# Patient Record
Sex: MALE | Race: WHITE | Employment: UNEMPLOYED | URBAN - METROPOLITAN AREA
[De-identification: names, ages, dates, MRNs, and addresses within clinical notes are randomized per-mention and may not be internally consistent; named-entity substitution may affect disease eponyms.]

---

## 2017-04-05 ENCOUNTER — APPOINTMENT (OUTPATIENT)
Dept: CT IMAGING | Age: 57
End: 2017-04-05
Attending: EMERGENCY MEDICINE
Payer: SELF-PAY

## 2017-04-05 ENCOUNTER — HOSPITAL ENCOUNTER (EMERGENCY)
Age: 57
Discharge: HOME | End: 2017-04-05
Attending: EMERGENCY MEDICINE
Payer: SELF-PAY

## 2017-04-05 VITALS
HEIGHT: 70 IN | HEART RATE: 75 BPM | DIASTOLIC BLOOD PRESSURE: 79 MMHG | OXYGEN SATURATION: 96 % | WEIGHT: 153.22 LBS | BODY MASS INDEX: 21.94 KG/M2 | SYSTOLIC BLOOD PRESSURE: 135 MMHG | RESPIRATION RATE: 12 BRPM | TEMPERATURE: 98.2 F

## 2017-04-05 DIAGNOSIS — K52.9 GASTROENTERITIS, ACUTE: ICD-10-CM

## 2017-04-05 DIAGNOSIS — R79.89 ELEVATED LIVER FUNCTION TESTS: ICD-10-CM

## 2017-04-05 DIAGNOSIS — G43.919 INTRACTABLE MIGRAINE, UNSPECIFIED MIGRAINE TYPE: Primary | ICD-10-CM

## 2017-04-05 LAB
ALBUMIN SERPL BCP-MCNC: 3.1 G/DL (ref 3.5–5)
ALBUMIN/GLOB SERPL: 0.6 {RATIO} (ref 1.1–2.2)
ALP SERPL-CCNC: 123 U/L (ref 45–117)
ALT SERPL-CCNC: 150 U/L (ref 12–78)
ANION GAP BLD CALC-SCNC: 7 MMOL/L (ref 5–15)
APPEARANCE UR: CLEAR
AST SERPL W P-5'-P-CCNC: 133 U/L (ref 15–37)
BASOPHILS # BLD AUTO: 0 K/UL (ref 0–0.1)
BASOPHILS # BLD: 0 % (ref 0–1)
BILIRUB SERPL-MCNC: 0.5 MG/DL (ref 0.2–1)
BILIRUB UR QL: NEGATIVE
BUN SERPL-MCNC: 8 MG/DL (ref 6–20)
BUN/CREAT SERPL: 9 (ref 12–20)
CALCIUM SERPL-MCNC: 8.7 MG/DL (ref 8.5–10.1)
CHLORIDE SERPL-SCNC: 99 MMOL/L (ref 97–108)
CO2 SERPL-SCNC: 28 MMOL/L (ref 21–32)
COLOR UR: NORMAL
CREAT SERPL-MCNC: 0.92 MG/DL (ref 0.7–1.3)
EOSINOPHIL # BLD: 0 K/UL (ref 0–0.4)
EOSINOPHIL NFR BLD: 1 % (ref 0–7)
ERYTHROCYTE [DISTWIDTH] IN BLOOD BY AUTOMATED COUNT: 12.6 % (ref 11.5–14.5)
FLUAV AG NPH QL IA: NEGATIVE
FLUBV AG NOSE QL IA: NEGATIVE
GLOBULIN SER CALC-MCNC: 5.2 G/DL (ref 2–4)
GLUCOSE SERPL-MCNC: 99 MG/DL (ref 65–100)
GLUCOSE UR STRIP.AUTO-MCNC: NEGATIVE MG/DL
HCT VFR BLD AUTO: 42.6 % (ref 36.6–50.3)
HGB BLD-MCNC: 14.9 G/DL (ref 12.1–17)
HGB UR QL STRIP: NEGATIVE
KETONES UR QL STRIP.AUTO: NEGATIVE MG/DL
LEUKOCYTE ESTERASE UR QL STRIP.AUTO: NEGATIVE
LIPASE SERPL-CCNC: 123 U/L (ref 73–393)
LYMPHOCYTES # BLD AUTO: 23 % (ref 12–49)
LYMPHOCYTES # BLD: 1.4 K/UL (ref 0.8–3.5)
MAGNESIUM SERPL-MCNC: 1.7 MG/DL (ref 1.6–2.4)
MCH RBC QN AUTO: 33.9 PG (ref 26–34)
MCHC RBC AUTO-ENTMCNC: 35 G/DL (ref 30–36.5)
MCV RBC AUTO: 97 FL (ref 80–99)
MONOCYTES # BLD: 0.8 K/UL (ref 0–1)
MONOCYTES NFR BLD AUTO: 13 % (ref 5–13)
NEUTS SEG # BLD: 3.8 K/UL (ref 1.8–8)
NEUTS SEG NFR BLD AUTO: 63 % (ref 32–75)
NITRITE UR QL STRIP.AUTO: NEGATIVE
PH UR STRIP: 6 [PH] (ref 5–8)
PLATELET # BLD AUTO: 124 K/UL (ref 150–400)
POTASSIUM SERPL-SCNC: 4.3 MMOL/L (ref 3.5–5.1)
PROT SERPL-MCNC: 8.3 G/DL (ref 6.4–8.2)
PROT UR STRIP-MCNC: NEGATIVE MG/DL
RBC # BLD AUTO: 4.39 M/UL (ref 4.1–5.7)
SODIUM SERPL-SCNC: 134 MMOL/L (ref 136–145)
SP GR UR REFRACTOMETRY: 1.02 (ref 1–1.03)
UROBILINOGEN UR QL STRIP.AUTO: 1 EU/DL (ref 0.2–1)
WBC # BLD AUTO: 6 K/UL (ref 4.1–11.1)

## 2017-04-05 PROCEDURE — 83735 ASSAY OF MAGNESIUM: CPT | Performed by: EMERGENCY MEDICINE

## 2017-04-05 PROCEDURE — 81003 URINALYSIS AUTO W/O SCOPE: CPT | Performed by: EMERGENCY MEDICINE

## 2017-04-05 PROCEDURE — 74011000250 HC RX REV CODE- 250: Performed by: EMERGENCY MEDICINE

## 2017-04-05 PROCEDURE — 80053 COMPREHEN METABOLIC PANEL: CPT | Performed by: EMERGENCY MEDICINE

## 2017-04-05 PROCEDURE — 70450 CT HEAD/BRAIN W/O DYE: CPT

## 2017-04-05 PROCEDURE — 85025 COMPLETE CBC W/AUTO DIFF WBC: CPT | Performed by: EMERGENCY MEDICINE

## 2017-04-05 PROCEDURE — 83690 ASSAY OF LIPASE: CPT | Performed by: EMERGENCY MEDICINE

## 2017-04-05 PROCEDURE — 96361 HYDRATE IV INFUSION ADD-ON: CPT

## 2017-04-05 PROCEDURE — 99284 EMERGENCY DEPT VISIT MOD MDM: CPT

## 2017-04-05 PROCEDURE — 96375 TX/PRO/DX INJ NEW DRUG ADDON: CPT

## 2017-04-05 PROCEDURE — 74011250636 HC RX REV CODE- 250/636: Performed by: EMERGENCY MEDICINE

## 2017-04-05 PROCEDURE — 87804 INFLUENZA ASSAY W/OPTIC: CPT | Performed by: EMERGENCY MEDICINE

## 2017-04-05 PROCEDURE — 36415 COLL VENOUS BLD VENIPUNCTURE: CPT | Performed by: EMERGENCY MEDICINE

## 2017-04-05 PROCEDURE — 96374 THER/PROPH/DIAG INJ IV PUSH: CPT

## 2017-04-05 RX ORDER — ONDANSETRON 4 MG/1
4 TABLET, FILM COATED ORAL
Qty: 20 TAB | Refills: 0 | Status: SHIPPED | OUTPATIENT
Start: 2017-04-05

## 2017-04-05 RX ORDER — DIPHENHYDRAMINE HYDROCHLORIDE 50 MG/ML
25 INJECTION, SOLUTION INTRAMUSCULAR; INTRAVENOUS
Status: COMPLETED | OUTPATIENT
Start: 2017-04-05 | End: 2017-04-05

## 2017-04-05 RX ORDER — OXYCODONE AND ACETAMINOPHEN 5; 325 MG/1; MG/1
1 TABLET ORAL
Qty: 20 TAB | Refills: 0 | Status: SHIPPED | OUTPATIENT
Start: 2017-04-05

## 2017-04-05 RX ORDER — HYDROMORPHONE HYDROCHLORIDE 1 MG/ML
1 INJECTION, SOLUTION INTRAMUSCULAR; INTRAVENOUS; SUBCUTANEOUS
Status: COMPLETED | OUTPATIENT
Start: 2017-04-05 | End: 2017-04-05

## 2017-04-05 RX ORDER — LOPERAMIDE HYDROCHLORIDE 2 MG/1
2 CAPSULE ORAL
Qty: 20 CAP | Refills: 0 | Status: SHIPPED | OUTPATIENT
Start: 2017-04-05

## 2017-04-05 RX ORDER — MORPHINE SULFATE 2 MG/ML
6 INJECTION, SOLUTION INTRAMUSCULAR; INTRAVENOUS
Status: COMPLETED | OUTPATIENT
Start: 2017-04-05 | End: 2017-04-05

## 2017-04-05 RX ORDER — ONDANSETRON 2 MG/ML
4 INJECTION INTRAMUSCULAR; INTRAVENOUS
Status: COMPLETED | OUTPATIENT
Start: 2017-04-05 | End: 2017-04-05

## 2017-04-05 RX ORDER — DIAZEPAM 5 MG/1
5 TABLET ORAL
Qty: 20 TAB | Refills: 0 | Status: SHIPPED | OUTPATIENT
Start: 2017-04-05

## 2017-04-05 RX ADMIN — SODIUM CHLORIDE 1000 ML: 900 INJECTION, SOLUTION INTRAVENOUS at 16:06

## 2017-04-05 RX ADMIN — ONDANSETRON HYDROCHLORIDE 4 MG: 2 INJECTION, SOLUTION INTRAMUSCULAR; INTRAVENOUS at 16:07

## 2017-04-05 RX ADMIN — SODIUM CHLORIDE 10 MG: 9 INJECTION INTRAMUSCULAR; INTRAVENOUS; SUBCUTANEOUS at 17:05

## 2017-04-05 RX ADMIN — DIPHENHYDRAMINE HYDROCHLORIDE 25 MG: 50 INJECTION, SOLUTION INTRAMUSCULAR; INTRAVENOUS at 17:03

## 2017-04-05 RX ADMIN — SODIUM CHLORIDE 1000 ML: 900 INJECTION, SOLUTION INTRAVENOUS at 17:19

## 2017-04-05 RX ADMIN — Medication 6 MG: at 16:06

## 2017-04-05 RX ADMIN — HYDROMORPHONE HYDROCHLORIDE 1 MG: 1 INJECTION, SOLUTION INTRAMUSCULAR; INTRAVENOUS; SUBCUTANEOUS at 17:02

## 2017-04-05 NOTE — ED NOTES
Pt sleeping when RN approached him for re-evaluation. Vital signs stable. No acute distress noted. When asked about his pain, pt requested more pain medication-Dr. Graham Esposito aware.

## 2017-04-05 NOTE — ED NOTES
Unable to administer Morphine because it was not verified by Pharmacy. Spoke with [facility] to resolve this issue.

## 2017-04-05 NOTE — ED PROVIDER NOTES
HPI Comments: Milena Tee is a 49 y.o. male who presents ambulatory to ED c/o constant, 10/10, throbbing right sided headache that radiates down his neck for the past 3 days, along with associated sinus congestion. Pt also states he has had diarrhea and moderate abdominal pain for the past week. Pt states he has been taking BC's and NyQuil with no relief. Pt states he does not typically have pain in his neck with his normal headaches. Pt denies any fever, body aches, head trauma. There are no other complaints, changes or physical findings at this time. The history is provided by the patient. No  was used. History reviewed. No pertinent past medical history. History reviewed. No pertinent surgical history. History reviewed. No pertinent family history. Social History     Social History    Marital status: SINGLE     Spouse name: N/A    Number of children: N/A    Years of education: N/A     Occupational History    Not on file. Social History Main Topics    Smoking status: Current Every Day Smoker     Packs/day: 0.50     Years: 30.00    Smokeless tobacco: Not on file    Alcohol use Yes      Comment: 1 alcohol on weekend    Drug use: No    Sexual activity: Not on file     Other Topics Concern    Not on file     Social History Narrative    No narrative on file         ALLERGIES: Review of patient's allergies indicates no known allergies. Review of Systems   Constitutional: Negative for fever. HENT: Positive for congestion. Eyes: Negative. Respiratory: Negative for chest tightness. Cardiovascular: Negative for chest pain. Gastrointestinal: Positive for abdominal pain and diarrhea. Endocrine: Negative for heat intolerance. Genitourinary: Negative for dysuria. Musculoskeletal: Positive for neck pain. Negative for myalgias. Skin: Negative for rash. Allergic/Immunologic: Negative for immunocompromised state.    Neurological: Positive for headaches. Hematological: Does not bruise/bleed easily. Psychiatric/Behavioral: Negative. All other systems reviewed and are negative. Vitals:    04/05/17 1301 04/05/17 1510   BP: 127/76 128/77   Pulse: (!) 111 87   Resp: 18 20   Temp: 98.2 °F (36.8 °C)    SpO2: 100% 94%   Weight: 69.5 kg (153 lb 3.5 oz)    Height: 5' 10\" (1.778 m)             Physical Exam   Constitutional: He is oriented to person, place, and time. He appears well-developed and well-nourished. He appears distressed (moderate). HENT:   Head: Normocephalic and atraumatic. Eyes: EOM are normal. Pupils are equal, round, and reactive to light. Neck: Normal range of motion. Neck supple. Neck non-tender   Cardiovascular: Normal rate, regular rhythm and normal heart sounds. Pulmonary/Chest: Effort normal and breath sounds normal. He has no wheezes. Abdominal: Soft. Bowel sounds are normal. There is no tenderness. Musculoskeletal: Normal range of motion. He exhibits no edema or tenderness. Neurological: He is alert and oriented to person, place, and time. He has normal strength. No cranial nerve deficit or sensory deficit. Skin: Skin is warm and dry. Psychiatric: He has a normal mood and affect. Nursing note and vitals reviewed. MDM  Number of Diagnoses or Management Options  Elevated liver function tests:   Gastroenteritis, acute: Intractable migraine, unspecified migraine type:   Diagnosis management comments: DDx: migraine, gastroenteritis, viral syndrome, dehydration        Amount and/or Complexity of Data Reviewed  Clinical lab tests: reviewed and ordered  Tests in the radiology section of CPT®: ordered and reviewed  Review and summarize past medical records: yes    Patient Progress  Patient progress: stable    ED Course       Procedures    3:30 PM  Discussed the risks of smoking and the benefits of smoking cessation as well as the long term sequelae of smoking with the pt.  The patient verbalized their understanding. Written by Marsha Ledesma, ED Scribe, as dictated by Joshua Padgett MD.    Progress Note  4:36 PM    Joshua Padgett MD has re-evaluated pt, and pt states the Morphine did not improve his pain. Will order head CT. SIGN OUT:  4:37 PM  Patient's presentation, labs/imaging and plan of care was reviewed with Anish Drake MD as part of sign out. They will review head CT as part of the plan discussed with the patient. Anish Drake MD's assistance in completion of this plan is greatly appreciated but it should be noted that I will be the provider of record for this patient. Joshua Padgett MD    Attestations: This note is prepared by Marsha Monaco. Kapil Ledesma, acting as Scribe for Joshua Padgett MD.    Joshua Padgett MD: The scribe's documentation has been prepared under my direction and personally reviewed by me in its entirety. I confirm that the note above accurately reflects all work, treatment, procedures, and medical decision making performed by me.    6:33 PM   Pt's HA improved. Results discussed and will follow up with PCP. Written by Corrinne Chance ED Scribe as dictated by Anish Drake MD    DISCHARGE NOTE:  LABORATORY TESTS:  Recent Results (from the past 12 hour(s))   CBC WITH AUTOMATED DIFF    Collection Time: 04/05/17  3:52 PM   Result Value Ref Range    WBC 6.0 4.1 - 11.1 K/uL    RBC 4.39 4. 10 - 5.70 M/uL    HGB 14.9 12.1 - 17.0 g/dL    HCT 42.6 36.6 - 50.3 %    MCV 97.0 80.0 - 99.0 FL    MCH 33.9 26.0 - 34.0 PG    MCHC 35.0 30.0 - 36.5 g/dL    RDW 12.6 11.5 - 14.5 %    PLATELET 932 (L) 415 - 400 K/uL    NEUTROPHILS 63 32 - 75 %    LYMPHOCYTES 23 12 - 49 %    MONOCYTES 13 5 - 13 %    EOSINOPHILS 1 0 - 7 %    BASOPHILS 0 0 - 1 %    ABS. NEUTROPHILS 3.8 1.8 - 8.0 K/UL    ABS. LYMPHOCYTES 1.4 0.8 - 3.5 K/UL    ABS. MONOCYTES 0.8 0.0 - 1.0 K/UL    ABS. EOSINOPHILS 0.0 0.0 - 0.4 K/UL    ABS.  BASOPHILS 0.0 0.0 - 0.1 K/UL   METABOLIC PANEL, COMPREHENSIVE Collection Time: 04/05/17  3:52 PM   Result Value Ref Range    Sodium 134 (L) 136 - 145 mmol/L    Potassium 4.3 3.5 - 5.1 mmol/L    Chloride 99 97 - 108 mmol/L    CO2 28 21 - 32 mmol/L    Anion gap 7 5 - 15 mmol/L    Glucose 99 65 - 100 mg/dL    BUN 8 6 - 20 MG/DL    Creatinine 0.92 0.70 - 1.30 MG/DL    BUN/Creatinine ratio 9 (L) 12 - 20      GFR est AA >60 >60 ml/min/1.73m2    GFR est non-AA >60 >60 ml/min/1.73m2    Calcium 8.7 8.5 - 10.1 MG/DL    Bilirubin, total 0.5 0.2 - 1.0 MG/DL    ALT (SGPT) 150 (H) 12 - 78 U/L    AST (SGOT) 133 (H) 15 - 37 U/L    Alk. phosphatase 123 (H) 45 - 117 U/L    Protein, total 8.3 (H) 6.4 - 8.2 g/dL    Albumin 3.1 (L) 3.5 - 5.0 g/dL    Globulin 5.2 (H) 2.0 - 4.0 g/dL    A-G Ratio 0.6 (L) 1.1 - 2.2     MAGNESIUM    Collection Time: 04/05/17  3:52 PM   Result Value Ref Range    Magnesium 1.7 1.6 - 2.4 mg/dL   LIPASE    Collection Time: 04/05/17  3:52 PM   Result Value Ref Range    Lipase 123 73 - 393 U/L   INFLUENZA A & B AG (RAPID TEST)    Collection Time: 04/05/17  3:52 PM   Result Value Ref Range    Influenza A Antigen NEGATIVE  NEG      Influenza B Antigen NEGATIVE  NEG     URINALYSIS W/ RFLX MICROSCOPIC    Collection Time: 04/05/17  5:11 PM   Result Value Ref Range    Color YELLOW/STRAW      Appearance CLEAR CLEAR      Specific gravity 1.022 1.003 - 1.030      pH (UA) 6.0 5.0 - 8.0      Protein NEGATIVE  NEG mg/dL    Glucose NEGATIVE  NEG mg/dL    Ketone NEGATIVE  NEG mg/dL    Bilirubin NEGATIVE  NEG      Blood NEGATIVE  NEG      Urobilinogen 1.0 0.2 - 1.0 EU/dL    Nitrites NEGATIVE  NEG      Leukocyte Esterase NEGATIVE  NEG         IMAGING RESULTS:  CT Results  (Last 48 hours)               04/05/17 1751  CT HEAD WO CONT Final result    Impression:  IMPRESSION: No acute intracranial abnormality.                Narrative:  EXAM:  CT HEAD WO CONT       INDICATION:   pain with constant and throbbing pain in the right side of his   head and neck for 3 days with dizziness COMPARISON: None. TECHNIQUE: Unenhanced CT of the head was performed using 5 mm images. Brain and   bone windows were generated. CT dose reduction was achieved through use of a   standardized protocol tailored for this examination and automatic exposure   control for dose modulation. FINDINGS:   The ventricles and sulci are normal in size, shape and configuration and   midline. There is no significant white matter disease. There is no intracranial   hemorrhage, extra-axial collection, mass, mass effect or midline shift. The   basilar cisterns are open. No acute infarct is identified. The bone windows   demonstrate no abnormalities. The visualized portions of the paranasal sinuses   and mastoid air cells are clear. MEDICATIONS GIVEN:  Medications   sodium chloride 0.9 % bolus infusion 1,000 mL (0 mL IntraVENous IV Completed 4/5/17 1706)   morphine injection 6 mg (6 mg IntraVENous Given 4/5/17 1606)   ondansetron (ZOFRAN) injection 4 mg (4 mg IntraVENous Given 4/5/17 1607)   HYDROmorphone (PF) (DILAUDID) injection 1 mg (1 mg IntraVENous Given 4/5/17 1702)   prochlorperazine (COMPAZINE) with saline injection 10 mg (10 mg IntraVENous Given 4/5/17 1705)   diphenhydrAMINE (BENADRYL) injection 25 mg (25 mg IntraVENous Given 4/5/17 1703)   sodium chloride 0.9 % bolus infusion 1,000 mL (1,000 mL IntraVENous New Bag 4/5/17 1719)       IMPRESSION:  1. Intractable migraine, unspecified migraine type    2. Gastroenteritis, acute    3. Elevated liver function tests        PLAN:  Current Discharge Medication List      START taking these medications    Details   oxyCODONE-acetaminophen (PERCOCET) 5-325 mg per tablet Take 1 Tab by mouth every four (4) hours as needed for Pain. Max Daily Amount: 6 Tabs. Qty: 20 Tab, Refills: 0      diazePAM (VALIUM) 5 mg tablet Take 1 Tab by mouth three (3) times daily as needed for Anxiety (spasm).  Max Daily Amount: 15 mg.  Qty: 20 Tab, Refills: 0      ondansetron hcl (ZOFRAN, AS HYDROCHLORIDE,) 4 mg tablet Take 1 Tab by mouth every eight (8) hours as needed for Nausea. Qty: 20 Tab, Refills: 0      loperamide (IMODIUM) 2 mg capsule Take 1 Cap by mouth four (4) times daily as needed for Diarrhea. Qty: 20 Cap, Refills: 0           Follow-up Information     Follow up With Details Comments [address] Lopez MD In 2 days As needed [address] [facility] [address]  [phone]      [facility] EMERGENCY DEPT  If symptoms worsen [address] [place] [address]  [phone]    see a PCP  Call in 1 day to schedule an appointment regarding your liver function tests         Return to ED if worse     DISCHARGE NOTE:  6:33 PM  Pt has been reexamined. Pt has no new complaints, changes, or physical findings. Care plan outlined and precautions discussed. All available results reviewed with pt. All medications reviewed with pt. All of pts questions and concerns addressed. Pt agrees to f/u as instructed and agrees to return to ED upon further deterioration. Pt is ready to go home. Written by Carlos Pierson ED Scribe as dictated by Beto Conde MD    ATTESTATION   This note is prepared by Carlos Pierson acting as scribe for MD Beto Kauffman MD : The scribe's documentation has been prepared under my direction and personally reviewed by me in its entirety. I confirm that the note above accurately reflects all work, treatment, procedures, and medical decision making performed by me.

## 2017-04-05 NOTE — ED NOTES
Dr. Marcela Wesley at bedside to provide discharge paperwork. Vital signs stable. Pt in no apparent distress at this time. Mental status at baseline. Ambulatory to waiting room with steady gate, discharge paperwork in hand. Pt states he will not being driving himself and has a friend coming to the ED to drive him home.

## 2017-04-05 NOTE — DISCHARGE INSTRUCTIONS
Liver Function Tests: About These Tests  What is it? Liver function tests check how well your liver is working. Some tests measure the amount of certain enzymes in your blood to check whether the liver is damaged or inflamed. Other tests measure how well the liver can make important proteins or clear waste products from the body. Your doctor will use the test results to help look for certain conditions, such as hepatitis, cirrhosis, or gallbladder problems. Test results that are not normal do not always mean there is a problem with your liver. Your doctor can determine if there is a problem based on your results. The tests may include:  · Alkaline phosphatase (ALP). This test measures the amount of the enzyme ALP in your blood. · Total protein. A total serum protein test measures the amounts of two major groups of proteins in your blood (albumin and globulin) and the total amount of protein. · Bilirubin. This test measures the amount of bilirubin in your blood. When bilirubin levels are high, the skin and whites of the eyes may appear yellow (jaundice). This may be caused by liver disease. · Aspartate aminotransferase (AST) and alanine aminotransferase (ALT). These tests measure the amount of the enzymes AST and ALT in your blood. (Aminotransferase is also known as a transaminase. High levels may be called transaminitis.)  Why is this test done? Liver function tests check how well your liver is working. Some tests help show whether your liver is damaged or inflamed. Your doctor may order liver function tests if you have symptoms of liver disease. These tests also may be done to see how well a treatment for liver disease is working. How can you prepare for the test?  In general, you do not need to prepare before having these tests. Your doctor may give you some specific instructions to prepare. What happens during the test?  A health professional takes a sample of your blood.   What happens after the test?  You will probably be able to go home right away. When should you call for help? Watch closely for changes in your health, and be sure to contact your doctor if you have any problems. Follow-up care is a key part of your treatment and safety. Be sure to make and go to all appointments, and call your doctor if you are having problems. It's also a good idea to keep a list of the medicines you take. Ask your doctor when you can expect to have your test results. Where can you learn more? Go to http://marion-puma.info/. Enter L558 in the search box to learn more about \"Liver Function Tests: About These Tests. \"  Current as of: October 14, 2016  Content Version: 11.2  © 9281-0141 [employer]. Care instructions adapted under license by [employer] (which disclaims liability or warranty for this information). If you have questions about a medical condition or this instruction, always ask your healthcare professional. [place] [address] any warranty or liability for your use of this information. Gastroenteritis: Care Instructions  Your Care Instructions  Gastroenteritis is an illness that may cause nausea, vomiting, and diarrhea. It is sometimes called \"stomach flu. \" It can be caused by bacteria or a virus. You will probably begin to feel better in 1 to 2 days. In the meantime, get plenty of rest and make sure you do not become dehydrated. Dehydration occurs when your body loses too much fluid. Follow-up care is a key part of your treatment and safety. Be sure to make and go to all appointments, and call your doctor if you are having problems. Its also a good idea to know your test results and keep a list of the medicines you take. How can you care for yourself at home? · If your doctor prescribed antibiotics, take them as directed. Do not stop taking them just because you feel better. You need to take the full course of antibiotics.   · Drink plenty of fluids to prevent dehydration, enough so that your urine is light yellow or clear like water. Choose water and other caffeine-free clear liquids until you feel better. If you have kidney, heart, or liver disease and have to limit fluids, talk with your doctor before you increase your fluid intake. · Drink fluids slowly, in frequent, small amounts, because drinking too much too fast can cause vomiting. · Begin eating mild foods, such as dry toast, yogurt, applesauce, bananas, and rice. Avoid spicy, hot, or high-fat foods, and do not drink alcohol or caffeine for a day or two. Do not drink milk or eat ice cream until you are feeling better. How to prevent gastroenteritis  · Keep hot foods hot and cold foods cold. · Do not eat meats, dressings, salads, or other foods that have been kept at room temperature for more than 2 hours. · Use a thermometer to check your refrigerator. It should be between 34°F and 40°F.  · Defrost meats in the refrigerator or microwave, not on the kitchen counter. · Keep your hands and your kitchen clean. Wash your hands, cutting boards, and countertops with hot soapy water frequently. · Cook meat until it is well done. · Do not eat raw eggs or uncooked sauces made with raw eggs. · Do not take chances. If food looks or tastes spoiled, throw it out. When should you call for help? Call 911 anytime you think you may need emergency care. For example, call if:  · You vomit blood or what looks like coffee grounds. · You passed out (lost consciousness). · You pass maroon or very bloody stools. Call your doctor now or seek immediate medical care if:  · You have severe belly pain. · You have signs of needing more fluids. You have sunken eyes, a dry mouth, and pass only a little dark urine. · You feel like you are going to faint. · You have increased belly pain that does not go away in 1 to 2 days. · You have new or increased nausea, or you are vomiting.   · You have a new or higher fever. · Your stools are black and tarlike or have streaks of blood. Watch closely for changes in your health, and be sure to contact your doctor if:  · You are dizzy or lightheaded. · You urinate less than usual, or your urine is dark yellow or brown. · You do not feel better with each day that goes by. Where can you learn more? Go to http://marion-puma.info/. Enter N142 in the search box to learn more about \"Gastroenteritis: Care Instructions. \"  Current as of: May 24, 2016  Content Version: 11.2  © 1088-7868 [employer]. Care instructions adapted under license by [employer] (which disclaims liability or warranty for this information). If you have questions about a medical condition or this instruction, always ask your healthcare professional. [address] any warranty or liability for your use of this information. Migraine Headache: Care Instructions  Your Care Instructions  Migraines are painful, throbbing headaches that often start on one side of the head. They may cause nausea and vomiting and make you sensitive to light, sound, or smell. Without treatment, migraines can last from 4 hours to a few days. Medicines can help prevent migraines or stop them after they have started. Your doctor can help you find which ones work best for you. Follow-up care is a key part of your treatment and safety. Be sure to make and go to all appointments, and call your doctor if you are having problems. It's also a good idea to know your test results and keep a list of the medicines you take. How can you care for yourself at home? · Do not drive if you have taken a prescription pain medicine. · Rest in a quiet, dark room until your headache is gone. Close your eyes, and try to relax or go to sleep. Don't watch TV or read. · Put a cold, moist cloth or cold pack on the painful area for 10 to 20 minutes at a time.  Put a thin cloth between the cold pack and your skin. · Use a warm, moist towel or a heating pad set on low to relax tight shoulder and neck muscles. · Have someone gently massage your neck and shoulders. · Take your medicines exactly as prescribed. Call your doctor if you think you are having a problem with your medicine. You will get more details on the specific medicines your doctor prescribes. · Be careful not to take pain medicine more often than the instructions allow. You could get worse or more frequent headaches when the medicine wears off. To prevent migraines  · Keep a headache diary so you can figure out what triggers your headaches. Avoiding triggers may help you prevent headaches. Record when each headache began, how long it lasted, and what the pain was like. (Was it throbbing, aching, stabbing, or dull?) Write down any other symptoms you had with the headache, such as nausea, flashing lights or dark spots, or sensitivity to bright light or loud noise. Note if the headache occurred near your period. List anything that might have triggered the headache. Triggers may include certain foods (chocolate, cheese, wine) or odors, smoke, bright light, stress, or lack of sleep. · If your doctor has prescribed medicine for your migraines, take it as directed. You may have medicine that you take only when you get a migraine and medicine that you take all the time to help prevent migraines. ¨ If your doctor has prescribed medicine for when you get a headache, take it at the first sign of a migraine, unless your doctor has given you other instructions. ¨ If your doctor has prescribed medicine to prevent migraines, take it exactly as prescribed. Call your doctor if you think you are having a problem with your medicine. · Find healthy ways to deal with stress. Migraines are most common during or right after stressful times.  Take time to relax before and after you do something that has caused a migraine in the past.  · Try to keep your muscles relaxed by keeping good posture. Check your jaw, face, neck, and shoulder muscles for tension. Try to relax them. When you sit at a desk, change positions often. And make sure to stretch for 30 seconds each hour. · Get plenty of sleep and exercise. · Eat meals on a regular schedule. Avoid foods and drinks that often trigger migraines. These include chocolate, alcohol (especially red wine and port), aspartame, monosodium glutamate (MSG), and some additives found in foods (such as hot dogs, javier, cold cuts, aged cheeses, and pickled foods). · Limit caffeine. Don't drink too much coffee, tea, or soda. But don't quit caffeine suddenly. That can also give you migraines. · Do not smoke or allow others to smoke around you. If you need help quitting, talk to your doctor about stop-smoking programs and medicines. These can increase your chances of quitting for good. · If you are taking birth control pills or hormone therapy, talk to your doctor about whether they are triggering your migraines. When should you call for help? Call 911 anytime you think you may need emergency care. For example, call if:  · You have signs of a stroke. These may include:  ¨ Sudden numbness, paralysis, or weakness in your face, arm, or leg, especially on only one side of your body. ¨ Sudden vision changes. ¨ Sudden trouble speaking. ¨ Sudden confusion or trouble understanding simple statements. ¨ Sudden problems with walking or balance. ¨ A sudden, severe headache that is different from past headaches. Call your doctor now or seek immediate medical care if:  · You have new or worse nausea and vomiting. · You have a new or higher fever. · Your headache gets much worse. Watch closely for changes in your health, and be sure to contact your doctor if:  · You are not getting better after 2 days (48 hours). Where can you learn more? Go to http://marion-puma.info/.   Enter J692 in the search box to learn more about \"Migraine Headache: Care Instructions. \"  Current as of: October 14, 2016  Content Version: 11.2  © 7012-5844 [employer]. Care instructions adapted under license by [employer] (which disclaims liability or warranty for this information). If you have questions about a medical condition or this instruction, always ask your healthcare professional. [address]  any warranty or liability for your use of this information. [facility] Departments     For adult and child immunizations, family planning, TB screening, STD testing and women's health services. [facility]: [place] [phone]      [facility] 25   [address]   [address]   170 [facility]: [facility] 200 [facility] [phone]      [address] [place] [address]          [address] [place] [address]     For primary care services, woman and child wellness, and some clinics providing specialty care. [facility] -- 1011 [facility]. [address] [place] [address] [phone]   411 [facility] 200 [facility] [address] [place] [address] [phone]   339 [facility] [address]  [phone] [address] [facility] [address] [place] [address] 5850  Community  [phone]   [address] [place] [address] [address] [place] [phone]   [facility] 81 [facility] [phone]   [address] [phone]   [facility]: [facility] 700 audie Edwards 79 [facility], [address] [phone]     [address] [phone]   [facility] 5850  Community  [phone]   [facility]  [address], [address] ([email or web address]) [phone]         Sexual Health/Woman Wellness Clinics    For STD/HIV testing and treatment, pregnancy testing and services, men's health, birth control services, LGBT services, and hepatitis/HPV vaccine services. [facility] for [place] [employer] [address] [facility] 75 [facility] 1579 600 SANAM Orozco Every [phone]   [facility] [address] , 5th floor [phone]   Pregnancy [address] [facility] [address]  [place] [phone]         [place] [address] [facility] [address] [place] [address]   [phone]   [facility]   [phone]   Women, Infant and Children's Services: [address] [phone]       600 [facility]   [phone]   [address]   [facility]     [phone]   [address] Crisis   1212 [facility] [phone]     Local [facility]  64 [facility] [phone]  MD Yane Regan MD Albertina Boards, MD [facility] [phone]  [facility] Bachelor, FNP  Reino Saint, MD Leoma Been, MD Tonja Drafts, MD   [facility] 83 [phone]  [place] [address] [facility] [phone]  MD Anastacio Brower MD Garlin Lack, MD Denys Kelch, MD   [facility] [phone]  PETRA BEACH, MD Jennefer Neas, MD Oleta Boast, NP [address] [phone]  Jyl Pott, MD Wyline Spatz, MD Helyn Bury, MD Lenell Canary, MD Daisy Niece, MD Dufm Holt, MD Annah Dunn, MD   [facility] [phone]  Domo Aguilar MD [facility] [phone]  MD Oliver Hurd, NP  MD Nini López MD Ellene Clutter, MD Verneda Din, MD Albertus Lot Alen Alvarado MD   [address] [facility] [phone]  French Koo, MD Litzy Brambila, FNP  Lana Maxwell, NP  Chely Martinez, MD Marisela Calloway, MD Kathie Krishnamurthy, MD Ifeoma Menjivar, MD RIOS [facility] [phone]  MD No Gomes, MD Daily Palacio, MD Karlos Rockwell, MD Eliezer Ty, MD   [facility] [phone]  Nitesh Payne, MD Kiran Tinoco, MD [address] [phone]  Barry Cruz, MD Emani Carrion, MD Adama Billingsley, MD   [facility] [phone]  Ila Marcelo, MD Capri Jansen, MD Trina Zavala, MD Bhavani Garcia, MD Kirstin Das, MD Sukhjinder Aceves, NP  Sina Stokes MD 1619 [facility]   [phone]  MD Gisel Baldwin MD Shaun Decamp, MD   2105 [facility] [phone]  [facility] 150, MD Wil Hutchison, FNP  Nakul Cervantes, CHARLIE Cervantes, ARIK Estrada Sa, PA-C Spence Sloan, MD Raiford Meigs, JESSE Mandel, DO Miscellaneous:  Leyda Cabrera [phone]

## 2017-04-05 NOTE — ED NOTES
Assumed care of pt. At this time. Pt. In duckworth bed 7. Pt states 10/10 constant/throbbing pain in right side of head and neck for 3 days with flu like-symptoms x3 days. . Pt. States having dizziness, nausea, and diarrhea. Pt. States taking BC powder prior to coming to ED. Pt. States having SOB and chest spasms and pain at this time and x3 days.